# Patient Record
Sex: MALE | Race: WHITE | NOT HISPANIC OR LATINO | URBAN - NONMETROPOLITAN AREA
[De-identification: names, ages, dates, MRNs, and addresses within clinical notes are randomized per-mention and may not be internally consistent; named-entity substitution may affect disease eponyms.]

---

## 2022-12-20 ENCOUNTER — TELEHEALTH PROVIDED OTHER THAN IN PATIENT'S HOME (OUTPATIENT)
Dept: URBAN - NONMETROPOLITAN AREA CLINIC 9 | Facility: CLINIC | Age: 22
End: 2022-12-20

## 2022-12-20 VITALS
HEIGHT: 70 IN | DIASTOLIC BLOOD PRESSURE: 85 MMHG | HEART RATE: 81 BPM | SYSTOLIC BLOOD PRESSURE: 116 MMHG | RESPIRATION RATE: 15 BRPM | WEIGHT: 175 LBS

## 2022-12-20 DIAGNOSIS — F41.9 ANXIETY DISORDER, UNSPECIFIED: ICD-10-CM

## 2022-12-20 DIAGNOSIS — R19.4 CHANGE IN BOWEL HABIT: ICD-10-CM

## 2022-12-20 PROCEDURE — 99203 OFFICE O/P NEW LOW 30 MIN: CPT | Mod: 95

## 2022-12-20 NOTE — SERVICENOTES
This patient is being seen today via telehealth and is aware of the limitations of telemedicine and gives verbal consent to proceed with the visit. This visit was completed via audio/visual ZOOM due to the restrictions of the COVID-19 pandemic.  All issues as stated above were discussed and addressed with the patient.  This telemedicine visit took place with the patient in the office and my assistant, Ximena Silverio LPN present.

Start time:10:55
End time:11:10

## 2022-12-20 NOTE — SERVICEHPINOTES
Evens Landa   is a   23 yo  male   whom I am seeing as a new patient today.Patient comes in today for change in bowel habits and stool caliber He has a history of anxiety and depression. he was recently seen in the ER for left lower quadrant pain. CT of the abdomen and pelvis was unremarkable. CBC and CMP were unremarkable with the exception of a slightly elevated glucose at 120. He was also evaluated at his PCPs office. He had a negative Hemoccult. He notes that the diameter of the stool change after making dietary and lifestyle changes. He has not had a colonoscopy..